# Patient Record
Sex: MALE | Race: WHITE | NOT HISPANIC OR LATINO | Employment: OTHER | URBAN - NONMETROPOLITAN AREA
[De-identification: names, ages, dates, MRNs, and addresses within clinical notes are randomized per-mention and may not be internally consistent; named-entity substitution may affect disease eponyms.]

---

## 2024-01-04 ENCOUNTER — HOSPITAL ENCOUNTER (EMERGENCY)
Facility: HOSPITAL | Age: 24
Discharge: HOME | End: 2024-01-04
Attending: FAMILY MEDICINE
Payer: COMMERCIAL

## 2024-01-04 VITALS
OXYGEN SATURATION: 98 % | HEIGHT: 66 IN | WEIGHT: 287 LBS | SYSTOLIC BLOOD PRESSURE: 110 MMHG | TEMPERATURE: 100 F | BODY MASS INDEX: 46.12 KG/M2 | HEART RATE: 88 BPM | DIASTOLIC BLOOD PRESSURE: 68 MMHG | RESPIRATION RATE: 18 BRPM

## 2024-01-04 DIAGNOSIS — J10.1 INFLUENZA A: Primary | ICD-10-CM

## 2024-01-04 LAB
INFLUENZA A (OHS): POSITIVE
INFLUENZA B (OHS): NEGATIVE
SARS-COV-2 RDRP RESP QL NAA+PROBE: NEGATIVE

## 2024-01-04 PROCEDURE — 96372 THER/PROPH/DIAG INJ SC/IM: CPT | Performed by: FAMILY MEDICINE

## 2024-01-04 PROCEDURE — 63600175 PHARM REV CODE 636 W HCPCS: Performed by: FAMILY MEDICINE

## 2024-01-04 PROCEDURE — 99284 EMERGENCY DEPT VISIT MOD MDM: CPT

## 2024-01-04 PROCEDURE — 87400 INFLUENZA A/B EACH AG IA: CPT | Performed by: FAMILY MEDICINE

## 2024-01-04 PROCEDURE — 87635 SARS-COV-2 COVID-19 AMP PRB: CPT | Performed by: FAMILY MEDICINE

## 2024-01-04 RX ORDER — DEXAMETHASONE SODIUM PHOSPHATE 4 MG/ML
8 INJECTION, SOLUTION INTRA-ARTICULAR; INTRALESIONAL; INTRAMUSCULAR; INTRAVENOUS; SOFT TISSUE
Status: COMPLETED | OUTPATIENT
Start: 2024-01-04 | End: 2024-01-04

## 2024-01-04 RX ORDER — OSELTAMIVIR PHOSPHATE 75 MG/1
75 CAPSULE ORAL 2 TIMES DAILY
Qty: 10 CAPSULE | Refills: 0 | Status: SHIPPED | OUTPATIENT
Start: 2024-01-04 | End: 2024-01-04

## 2024-01-04 RX ORDER — OSELTAMIVIR PHOSPHATE 75 MG/1
75 CAPSULE ORAL 2 TIMES DAILY
Qty: 10 CAPSULE | Refills: 0 | Status: SHIPPED | OUTPATIENT
Start: 2024-01-04 | End: 2024-01-09

## 2024-01-04 RX ADMIN — DEXAMETHASONE SODIUM PHOSPHATE 8 MG: 4 INJECTION, SOLUTION INTRA-ARTICULAR; INTRALESIONAL; INTRAMUSCULAR; INTRAVENOUS; SOFT TISSUE at 09:01

## 2024-01-04 NOTE — ED PROVIDER NOTES
"Encounter Date: 1/4/2024       History     Chief Complaint   Patient presents with    Sore Throat    Dizziness    Weakness    Cough     Pt ambulated to ER room 2 with steady gait. Pt report "I almost fall going to that bathroom I am so weak sometimes." Pt c/o sore throat and cough starting 3 days ago. Pt capillary refill <3. Pt acyanotic. No acute distress noted.       Patient presents to the emergency room with sore throat and cough for 3 days.  He was here with several other family members with similar symptoms.  He feels weak and tired.    The history is provided by the patient.     Review of patient's allergies indicates:  No Known Allergies  History reviewed. No pertinent past medical history.  History reviewed. No pertinent surgical history.  History reviewed. No pertinent family history.     Review of Systems   Constitutional:  Positive for fatigue. Negative for fever.   HENT:  Negative for sore throat.    Respiratory:  Positive for cough. Negative for shortness of breath.    Cardiovascular:  Negative for chest pain.   Gastrointestinal:  Negative for nausea.   Genitourinary:  Negative for dysuria.   Musculoskeletal:  Negative for back pain.   Skin:  Negative for rash.   Neurological:  Negative for weakness.   Hematological:  Does not bruise/bleed easily.   All other systems reviewed and are negative.      Physical Exam     Initial Vitals [01/04/24 0757]   BP Pulse Resp Temp SpO2   124/80 102 18 99.7 °F (37.6 °C) (!) 94 %      MAP       --         Physical Exam    Nursing note and vitals reviewed.  Constitutional: Vital signs are normal. He appears well-developed and well-nourished. He is cooperative.  Non-toxic appearance. He does not appear ill.   HENT:   Head: Normocephalic and atraumatic.   Eyes: Conjunctivae and lids are normal.   Neck: Trachea normal. Neck supple.   Cardiovascular:  Normal rate and regular rhythm.  No extrasystoles are present.          Pulmonary/Chest: Breath sounds normal.   Abdominal: " Abdomen is soft. There is no abdominal tenderness.   Musculoskeletal:         General: Normal range of motion.      Cervical back: Neck supple.     Neurological: He is alert and oriented to person, place, and time. He has normal strength. No cranial nerve deficit or sensory deficit. He displays a negative Romberg sign.   Skin: Skin is warm, dry and intact. Capillary refill takes less than 2 seconds.   Psychiatric: He has a normal mood and affect. His speech is normal and behavior is normal. He is not actively hallucinating. He is attentive.         ED Course   Procedures  Labs Reviewed   RAPID INFLUENZA A/B - Abnormal; Notable for the following components:       Result Value    Influenza A Positive (*)     All other components within normal limits   SARS-COV-2 RNA AMPLIFICATION, QUAL - Normal          Imaging Results    None          Medications   dexAMETHasone injection 8 mg (8 mg Intramuscular Given 1/4/24 0923)     Medical Decision Making  Risk  Prescription drug management.                                      Clinical Impression:  Final diagnoses:  [J10.1] Influenza A (Primary)          ED Disposition Condition    Discharge Stable          ED Prescriptions       Medication Sig Dispense Start Date End Date Auth. Provider    oseltamivir (TAMIFLU) 75 MG capsule  (Status: Discontinued) Take 1 capsule (75 mg total) by mouth 2 (two) times daily. for 5 days 10 capsule 1/4/2024 1/4/2024 Michael Montenegro MD    oseltamivir (TAMIFLU) 75 MG capsule  (Status: Discontinued) Take 1 capsule (75 mg total) by mouth 2 (two) times daily. for 5 days 10 capsule 1/4/2024 1/4/2024 Michael Montenegro MD    oseltamivir (TAMIFLU) 75 MG capsule Take 1 capsule (75 mg total) by mouth 2 (two) times daily. for 5 days 10 capsule 1/4/2024 1/9/2024 Michael Montenegro MD          Follow-up Information       Follow up With Specialties Details Why Contact Info    Ry Gutiérrez MD Family Medicine Call  As needed [id number]  [address]  [place]  [id number]  [phone]               Michael Montenegro MD  01/04/24 1048